# Patient Record
Sex: FEMALE
[De-identification: names, ages, dates, MRNs, and addresses within clinical notes are randomized per-mention and may not be internally consistent; named-entity substitution may affect disease eponyms.]

---

## 2017-08-24 ENCOUNTER — HOSPITAL ENCOUNTER (OUTPATIENT)
Dept: HOSPITAL 72 - SUR | Age: 42
Discharge: HOME | End: 2017-08-24
Payer: COMMERCIAL

## 2017-08-24 VITALS — SYSTOLIC BLOOD PRESSURE: 144 MMHG | DIASTOLIC BLOOD PRESSURE: 81 MMHG

## 2017-08-24 VITALS — SYSTOLIC BLOOD PRESSURE: 120 MMHG | DIASTOLIC BLOOD PRESSURE: 72 MMHG

## 2017-08-24 VITALS — DIASTOLIC BLOOD PRESSURE: 78 MMHG | SYSTOLIC BLOOD PRESSURE: 135 MMHG

## 2017-08-24 VITALS — DIASTOLIC BLOOD PRESSURE: 75 MMHG | SYSTOLIC BLOOD PRESSURE: 133 MMHG

## 2017-08-24 VITALS — SYSTOLIC BLOOD PRESSURE: 132 MMHG | DIASTOLIC BLOOD PRESSURE: 77 MMHG

## 2017-08-24 VITALS — DIASTOLIC BLOOD PRESSURE: 82 MMHG | SYSTOLIC BLOOD PRESSURE: 120 MMHG

## 2017-08-24 VITALS — SYSTOLIC BLOOD PRESSURE: 146 MMHG | DIASTOLIC BLOOD PRESSURE: 98 MMHG

## 2017-08-24 VITALS — DIASTOLIC BLOOD PRESSURE: 75 MMHG | SYSTOLIC BLOOD PRESSURE: 131 MMHG

## 2017-08-24 VITALS — SYSTOLIC BLOOD PRESSURE: 126 MMHG | DIASTOLIC BLOOD PRESSURE: 75 MMHG

## 2017-08-24 VITALS — BODY MASS INDEX: 25.16 KG/M2 | WEIGHT: 166 LBS | HEIGHT: 68 IN

## 2017-08-24 VITALS — SYSTOLIC BLOOD PRESSURE: 130 MMHG | DIASTOLIC BLOOD PRESSURE: 74 MMHG

## 2017-08-24 VITALS — DIASTOLIC BLOOD PRESSURE: 71 MMHG | SYSTOLIC BLOOD PRESSURE: 128 MMHG

## 2017-08-24 VITALS — DIASTOLIC BLOOD PRESSURE: 77 MMHG | SYSTOLIC BLOOD PRESSURE: 132 MMHG

## 2017-08-24 VITALS — SYSTOLIC BLOOD PRESSURE: 121 MMHG | DIASTOLIC BLOOD PRESSURE: 70 MMHG

## 2017-08-24 VITALS — SYSTOLIC BLOOD PRESSURE: 138 MMHG | DIASTOLIC BLOOD PRESSURE: 82 MMHG

## 2017-08-24 DIAGNOSIS — K64.8: ICD-10-CM

## 2017-08-24 DIAGNOSIS — K60.5: Primary | ICD-10-CM

## 2017-08-24 PROCEDURE — 94003 VENT MGMT INPAT SUBQ DAY: CPT

## 2017-08-24 PROCEDURE — 94150 VITAL CAPACITY TEST: CPT

## 2017-08-24 PROCEDURE — 46275 REMOVE ANAL FIST INTER: CPT

## 2017-08-24 PROCEDURE — 81025 URINE PREGNANCY TEST: CPT

## 2017-08-24 RX ADMIN — SODIUM CHLORIDE PRN MG: 9 INJECTION, SOLUTION INTRAVENOUS at 09:31

## 2017-08-24 RX ADMIN — SODIUM CHLORIDE PRN MG: 9 INJECTION, SOLUTION INTRAVENOUS at 10:07

## 2017-08-24 RX ADMIN — SODIUM CHLORIDE PRN MG: 9 INJECTION, SOLUTION INTRAVENOUS at 09:48

## 2017-08-24 RX ADMIN — SODIUM CHLORIDE PRN MG: 9 INJECTION, SOLUTION INTRAVENOUS at 10:23

## 2017-08-24 NOTE — DISCHARGE INSTRUCTIONS
Discharge Instructions


Discharge Instructions


Diet:  regular


Activity:  as tolerated


Follow Up Orders


- remove dressings and tape tonight. May wear pantyliner


- no soaking, baths. Shower only.


- call for appt with Dr. Cai in 2-3 weeks.





For Surgical Patients


May shower:  Yes





For Congestive Heart Failure


Reminder


Report to your physician any weight gain of 5 pounds or more in one week.











LIANNA CAI Aug 24, 2017 09:19

## 2017-08-24 NOTE — IMMEDIATE POST-OP EVALUATION
Immediate Post-Op Evalulation


Immediate Post-Op Evalulation


Procedure:  Anal fistula repair


Date of Evaluation:  Aug 24, 2017


Time of Evaluation:  09:00


IV Fluids:  1000


Blood Products:  0


Estimated Blood Loss:  0


Urinary Output:  0


Blood Pressure Systolic:  123


Blood Pressure Diastolic:  72


Pulse Rate:  62


Respiratory Rate:  14


O2 Sat by Pulse Oximetry:  99


Temperature (Fahrenheit):  98


Pain Score (1-10):  0


Nausea:  No


Vomiting:  No


Patient Status:  awake, reacts, patent


Given Within 1 Hr of Incision:  Yes


Time Given:  07:20











Farzad Carmona M.D. Aug 24, 2017 08:24

## 2017-08-24 NOTE — BRIEF OPERATIVE NOTE
Immediate Post Operative Note


Operative Note


Chief Complaint:  Recurrent fistula


Pre-op Diagnosis:


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


Procedure:


Exam under anesthesia, ligation of fistula tract (LIFT)


Post-op Diagnosis:


same


Post-op Diagnosis:  same as pre-op


Findings:  consistent w/pre-op dx studies


Surgeon:  Miranda Goncalves MD


Assistant:  Marisol Fish MD


Anesthesiologist:  Farzad Carmona MD


Anesthesia:  moderate sedation


Specimen:  none


Complications:  none


Fluids:  TKO


Estimated Blood Loss:  minimal


Drains:  none


Implant(s) used?:  MIRANDA Stoner Aug 24, 2017 09:18

## 2017-08-24 NOTE — PRE-PROCEDURE NOTE/ATTESTATION
Pre-Procedure Note/Attestation


Complete Prior to Procedure


Planned Procedure:  not applicable


Procedure Narrative:


Exam under anesthesia, possible anal fistulotomy, possible ligation of fistula 

tract, possible seton





Indications for Procedure


Pre-Operative Diagnosis:


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





Attestation


I attest that I discussed the nature of the procedure; its benefits; risks and 

complications; and alternatives (and the risks and benefits of such alternatives

), prior to the procedure, with the patient (or the patient's legal 

representative).





I attest that, if there was a reasonable possibility of needing a blood 

transfusion, the patient (or the patient's legal representative) was given the 

[place] Department of Health Services standardized written summary, pursuant 

to the [place] Blood Safety Act ([place] Health and Safety Code # 1645, as 

amended).





I attest that I re-evaluated the patient just prior to the surgery and that 

there has been no change in the patient's H&P, except as documented below:











LIANNA CAI Aug 24, 2017 06:47

## 2017-08-24 NOTE — ANETHESIA PREOPERATIVE EVAL
Anesthesia Pre-op PMH/ROS


General


Date of Evaluation:  Aug 24, 2017


Time of Evaluation:  07:00


Anesthesiologist:  NIKITA


ASA Score:  ASA 2


Mallampati Score


Class I : Soft palate, uvula, fauces, pillars visible


Class II: Soft palate, uvula, fauces visible


Class III: Soft palate, base of uvula visible


Class IV: Only hard plate visible


Mallampati Classification:  Class II


Surgeon:  Sacha


Diagnosis:  anal fistula


Surgical Procedure:  anal fistulectomy


Anesthesia History:  none


Family History:  no anesthesia problems


Allergies:  


Coded Allergies:  


     No Known Allergies (Unverified , 8/24/17)


Medications:  see eMAR





Anesthesia Pre-op Phys. Exam


Physician Exam





Last Vital Signs








  Date Time  Temp Pulse Resp B/P (MAP) Pulse Ox O2 Delivery O2 Flow Rate FiO2


 


8/24/17 06:31 98.0 70 19 120/72 100 Room Air  








Constitutional:  NAD


Neurologic:  CN 2-12 intact


Cardiovascular:  RRR


Respiratory:  CTA





Airway Exam


Mallampati Score:  Class II


MO:  full


ROM:  full


Teeth:  intact





Anesthesia Pre-op A/P


Labs


Urine Pregnancy Test











Test


  8/24/17


06:05


 


Urine HCG, Qualitative Negative  











Risk Assessment & Plan


Plan:


GA





Pre-Antibiotics


Given Within 1 Hr of Incision:  Yes


Time Given:  07:20











Farzad Carmona M.D. Aug 24, 2017 08:23

## 2017-08-25 NOTE — OPERATIVE NOTE - DICTATED
DATE OF OPERATION:  08/24/2017



PREOPERATIVE DIAGNOSIS:  Recurrent fistula-in-ano.



POSTOPERATIVE DIAGNOSIS:  Recurrent fistula-in-ano.



PROCEDURE:  Exam under anesthesia, ligation of fistula tract.



SURGEON:  Miranda Goncalves M.D.



ASSISTANT SURGEON:  Marisol Fish M.D.



ANESTHESIOLOGIST:  Farzad Carmona M.D.



ANESTHESIA:  Propofol sedation with local anesthetic.



INDICATION FOR PROCEDURE:  The patient is a 42-year-old 

female, who was sent to my office by her surgeon, Dr. Luis Weiss for

colorectal surgical evaluation of recurrent anal fistulas.  The patient

has had multiple fistula surgeries x10 since 2004 ranging to 2014.  The

patient was in [place], [place] at that time and had eight surgeries on

her anal area there and moved to [place] where last two surgeries were

by Dr. Weiss.  Endorectal advancement flap was her last surgery.  The

patient had an MRI performed, which showed that there was an anal fistula.

The patient had two external draining tracts with scar tissue from her

previous surgeries.  In light of the patient's findings, it was determined

at this time to proceed with exam under anesthesia with ligation of

fistula tract.



DESCRIPTION OF PROCEDURE:  Upon consent of the patient, the patient

was brought to the operating room and placed supine on the operating room

table.  Once adequate sedation was established with propofol drip, the

patient's buttocks were prepped and draped in usual surgical fashion.   A

40 mL of 0.5% ropivacaine with epinephrine mixed with 6 mg of

dexamethasone was used as a perianal pudendal block.  During the surgery,

an additional 10 mL of 0.5% ropivacaine with epinephrine was used.   The

patient was noted in her physical exam to have scarring in the left

lateral perianal region and two external draining tracts in the left

lateral perianal region as well as the posterior midline.   A

Hill-Alejandro retractor was placed in the anal canal _____ moderate

internal hemorrhoids and some scar tissue in the left lateral anal canal.

The posterior midline external anal opening was probed and it was noted to

be connected to the internal opening at the dentate line located at the

posterior midline.  The probe from the left lateral external opening

connected to the external opening of the posterior midline area.   At the

posterior midline, lacrimal probe was palpated and it was noted to be

quite deep.  It was determined at this time to proceed with the LIFT

procedure, which is the ligation of the fistula tract.  A small incision

was made overlying the lacrimal probe in the posterior midline in the

intersphincteric space and dissected all way down to the probe.  The probe

was noted to be quite deep and there was extensive scar tissue due to her

previous surgeries.  The tract was isolated and was noted to be very deep.

So, decision was made to close the internal opening from the inside and

also from the intersphincteric space as well as in the internal anal

canal.  These were done with 2-0 Vicryl figure-of-eight sutures in the

anal canal as well as 3-0 Vicryls in the intersphincteric place to close

the internal opening.  The distal end of the fistula tract was ligated

with a figure-of-eight 2-0 Vicryl sutures.  The tracts were again probed

to make sure there was no connection to the internal opening.  The tracts

were then curetted of any granulation tissue and the wound was irrigated.

Hemostasis was confirmed.  The wound was closed with the sphincteric

muscle to buttress in between the divided tracts in interrupted layers

using interrupted 3-0 Vicryl sutures.  The skin was closed with 3-0 Vicryl

interrupted horizontal mattress sutures.  Sterile dry dressing was used as

an outer dressing.  Sponge, needle, and instrument counts were correct at

the end of the case.   The patient was awakened from anesthesia and

brought to postanesthesia recovery in stable condition.



ESTIMATED BLOOD LOSS:  Less than 5 mL.



DRAINS:  None.



SPECIMEN:  None.



COMPLICATIONS:  None.









  ______________________________________________

  Miranda Goncalves M.D.





DR:  LC/PM

D:  08/24/2017 09:27

T:  08/25/2017 02:46

JOB#:  [id number]

CC:  Miranda Goncalves M.D.; Fax#:  [phone] Torres M.D.; Fax#:  [phone]

## 2018-01-10 ENCOUNTER — HOSPITAL ENCOUNTER (OUTPATIENT)
Dept: HOSPITAL 72 - SUR | Age: 43
Discharge: HOME | End: 2018-01-10
Payer: COMMERCIAL

## 2018-01-10 VITALS — DIASTOLIC BLOOD PRESSURE: 70 MMHG | SYSTOLIC BLOOD PRESSURE: 121 MMHG

## 2018-01-10 VITALS — DIASTOLIC BLOOD PRESSURE: 72 MMHG | SYSTOLIC BLOOD PRESSURE: 116 MMHG

## 2018-01-10 VITALS — SYSTOLIC BLOOD PRESSURE: 128 MMHG | DIASTOLIC BLOOD PRESSURE: 76 MMHG

## 2018-01-10 VITALS — DIASTOLIC BLOOD PRESSURE: 67 MMHG | SYSTOLIC BLOOD PRESSURE: 125 MMHG

## 2018-01-10 VITALS — SYSTOLIC BLOOD PRESSURE: 119 MMHG | DIASTOLIC BLOOD PRESSURE: 72 MMHG

## 2018-01-10 VITALS — SYSTOLIC BLOOD PRESSURE: 119 MMHG | DIASTOLIC BLOOD PRESSURE: 80 MMHG

## 2018-01-10 VITALS — WEIGHT: 170 LBS | HEIGHT: 68 IN | BODY MASS INDEX: 25.76 KG/M2

## 2018-01-10 VITALS — DIASTOLIC BLOOD PRESSURE: 78 MMHG | SYSTOLIC BLOOD PRESSURE: 126 MMHG

## 2018-01-10 VITALS — SYSTOLIC BLOOD PRESSURE: 114 MMHG | DIASTOLIC BLOOD PRESSURE: 70 MMHG

## 2018-01-10 VITALS — DIASTOLIC BLOOD PRESSURE: 64 MMHG | SYSTOLIC BLOOD PRESSURE: 115 MMHG

## 2018-01-10 VITALS — DIASTOLIC BLOOD PRESSURE: 71 MMHG | SYSTOLIC BLOOD PRESSURE: 124 MMHG

## 2018-01-10 VITALS — SYSTOLIC BLOOD PRESSURE: 118 MMHG | DIASTOLIC BLOOD PRESSURE: 68 MMHG

## 2018-01-10 VITALS — DIASTOLIC BLOOD PRESSURE: 74 MMHG | SYSTOLIC BLOOD PRESSURE: 118 MMHG

## 2018-01-10 VITALS — DIASTOLIC BLOOD PRESSURE: 86 MMHG | SYSTOLIC BLOOD PRESSURE: 112 MMHG

## 2018-01-10 VITALS — DIASTOLIC BLOOD PRESSURE: 76 MMHG | SYSTOLIC BLOOD PRESSURE: 133 MMHG

## 2018-01-10 VITALS — SYSTOLIC BLOOD PRESSURE: 123 MMHG | DIASTOLIC BLOOD PRESSURE: 70 MMHG

## 2018-01-10 DIAGNOSIS — K60.3: Primary | ICD-10-CM

## 2018-01-10 DIAGNOSIS — K50.90: ICD-10-CM

## 2018-01-10 DIAGNOSIS — Z88.8: ICD-10-CM

## 2018-01-10 PROCEDURE — 81025 URINE PREGNANCY TEST: CPT

## 2018-01-10 PROCEDURE — 94003 VENT MGMT INPAT SUBQ DAY: CPT

## 2018-01-10 PROCEDURE — 94150 VITAL CAPACITY TEST: CPT

## 2018-01-10 RX ADMIN — MORPHINE SULFATE PRN MG: 2 INJECTION, SOLUTION INTRAMUSCULAR; INTRAVENOUS at 09:37

## 2018-01-10 RX ADMIN — MORPHINE SULFATE PRN MG: 2 INJECTION, SOLUTION INTRAMUSCULAR; INTRAVENOUS at 09:21

## 2018-01-10 NOTE — ANETHESIA PREOPERATIVE EVAL
Anesthesia Pre-op PMH/ROS


General


Date of Evaluation:  Francisco J 10, 2018


Anesthesiologist:  [place]


ASA Score:  ASA 2


Mallampati Score


Class I : Soft palate, uvula, fauces, pillars visible


Class II: Soft palate, uvula, fauces visible


Class III: Soft palate, base of uvula visible


Class IV: Only hard plate visible


Mallampati Classification:  Class II


Surgeon:  Sacha


Diagnosis:  Anal fistula


Surgical Procedure:  EUA and possible seton placement


Anesthesia History:  none


Family History:  no anesthesia problems


Allergies:  


Coded Allergies:  


     METRONIDAZOLE (Verified  Allergy, Intermediate, stomach sentivity , 1/10/18

)


Medications:  see eMAR





Past Medical History


Cardiovascular:  Denies: HTN, CAD, MI, valve dz, arrhythmia, other


Pulmonary:  Denies: asthma, COPD, RADHA, other


Gastrointestinal/Genitourinary:  Reports: other - Chrons, 


   Denies: GERD, CRI, ESRD


Neurologic/Psychiatric:  Denies: dementia, CVA, depression/anxiety, TIA, other


Endocrine:  Reports: other - PCOS, 


   Denies: DM, hypothyroidism, steroids


HEENT:  Denies: cataract (L), cataract (R), glaucoma, Nikolski (L), Nikolski (R), other


Hematology/Immune:  Denies: anemia, DVT, bleeding disorder, other


Musculoskeletal/Integumentary:  Denies: OA, RA, DJD, DDD, edema, other


PSxH Narrative:


Anal fistula repair, otoplasty





Anesthesia Pre-op Phys. Exam


Physician Exam





Last Vital Signs








  Date Time  Temp Pulse Resp B/P (MAP) Pulse Ox O2 Delivery O2 Flow Rate FiO2


 


1/10/18 06:39 98.9 74 18 133/76 98 Room Air  








Constitutional:  NAD


Cardiovascular:  RRR


Respiratory:  CTA





Airway Exam


Mallampati Score:  Class II


MO:  full


ROM:  full


Teeth:  intact





Anesthesia Pre-op A/P


Labs


see chart


Urine Pregnancy Test











Test


  1/10/18


06:10


 


Urine HCG, Qualitative Pending  











Studies


Pre-op Studies:  EKG - sr





Risk Assessment & Plan


Assessment:


ASA II


Plan:


GA


Status Change Before Surgery:  No





Pre-Antibiotics


Drug:  Cefoxitin 1g


Given Within 1 Hr of Incision:  Yes


Time Given:  07:15











ARNOLD ROGERS M.D. Francisco J 10, 2018 06:42

## 2018-01-10 NOTE — 48 HOUR POST ANESTHESIA EVAL
Post Anesthesia Evaluation


Procedure:  Rectal EUA and seton placement


Date of Evaluation:  Francisco J 10, 2018


Blood Pressure Systolic:  118


0:  74


Pulse Rate:  85


Respiratory Rate:  19


Temperature (Fahrenheit):  98


O2 Sat by Pulse Oximetry:  96


Airway:  patent


Nausea:  No


Vomiting:  No


Pain Intensity:  0


Hydration Status:  adequate


Cardiopulmonary Status:


at baseline


Mental Status/LOC:  patient returned to baseline


Post-Anesthesia Complications:


0


Follow-up care needed:  ready to discharge











ARNOLD ROGERS M.D. Francisco J 10, 2018 07:49

## 2018-01-10 NOTE — IMMEDIATE POST-OP EVALUATION
Immediate Post-Op Evalulation


Immediate Post-Op Evalulation


Procedure:  Rectal EUA and seton placement


Date of Evaluation:  Francisco J 10, 2018


Time of Evaluation:  08:27


IV Fluids:  800


Blood Products:  0


Estimated Blood Loss:  min


Urinary Output:  0


Blood Pressure Systolic:  129


Blood Pressure Diastolic:  85


Pulse Rate:  95


Respiratory Rate:  16


O2 Sat by Pulse Oximetry:  99


Temperature (Fahrenheit):  98


Pain Score (1-10):  0


Nausea:  No


Vomiting:  No


Complications


0


Patient Status:  awake, reacts, patent, none


Hydration Status:  adequate


Drug:  Cefoxitin 1g


Given Within 1 Hr of Incision:  Yes


Time Given:  07:15











ARNOLD ROGERS M.D. Francisco J 10, 2018 07:28

## 2018-01-10 NOTE — OPERATIVE NOTE - DICTATED
DATE OF OPERATION:  01/10/2018



PREOPERATIVE DIAGNOSIS:  Recurrent fistula-in-ano.



POSTOPERATIVE DIAGNOSIS:  Recurrent fistula-in-ano.



PROCEDURES:  Exam under anesthesia, anal fistulotomy, seton placement.



SURGEON:  Miranda Goncalves M.D.



ANESTHESIOLOGIST:  Tamie Capps M.D.



ANESTHESIA:  General endotracheal anesthesia.



INDICATION FOR PROCEDURE:  The patient is a 42-year-old  female

with a long history of recurrent anal fistulas.  The patient had 10

fistula repairs from 2004 to 2014.  The patient had endorectal

advancement, status post multiple anal fistulotomies.  The patient was

taken to surgery on 08/24/2017, by me for ligation of anal fistula tract.

This resulted in healing of the posterior wound, but persistent drainage

from the original incision.  In light of the patient's history, it was

determined at this time to proceed with a repeat exam under anesthesia

with anal fistulotomy at this time with seton placement.



DESCRIPTION OF PROCEDURE:  Upon consent of the patient, the patient was

brought to the operating room and intubated in the supine position on the

gurney.  Once general endotracheal anesthesia had been established, the

patient was repositioned in the prone jackknife position on the operating

room table.  The patient's buttocks were then prepped and draped in the

usual surgical fashion.  A 40 mL of 0.5% ropivacaine with epinephrine

mixed with 6 mg of dexamethasone was used as a perineal and pudendal

block.  A Hill-Alejandro retractor was placed in the anal canal.  There was

noted to be the original posterior fistula opening that was healed and

there was a persistent opening at the posterior midline, closure to the

anal verge from the original LIFT incision.  This was probed with a

lacrimal probe and there was a short fistula tract with an internal

opening at the dentate line in the midline.  The skin was opened overlying

the tract.   There was noted to be a significant amount of 40% of the

sphincteric muscle involved.  So, a seton was placed over this and over

surrounding the sphincteric area and secured with 0 silk sutures.  The

left perianal opening was curetted and probed and noted to be

communicating with the posterior midline internal opening.  There was no

other internal opening that was noted.  The tracts were then curetted and

irrigation was performed.   Hemostasis was confirmed.  Proctosigmoidoscopy

up to 25 cm was performed, which showed no other proctitis or abnormality.

There were multiple scarring of the anal area and the perianal region

secondary to her multiple surgeries.  A wet-to-dry dressing was used to

pack the wounds in the posterior midline, also in the left lateral

perianal area and sterile dry dressing was used overlying the wound.

Sponge, needle, and instrument counts were correct at the end of the case.

The patient was awakened from anesthesia, extubated, and brought to

postanesthesia recovery in stable condition.



ESTIMATED BLOOD LOSS:  10 mL.



DRAINS:  Seton in the posterior midline perianal region.



SPECIMEN:  None.



COMPLICATIONS:  None.









  ______________________________________________

  Miranda Goncalves M.D.





DR:  CEDRICK

D:  01/10/2018 08:15

T:  01/10/2018 11:38

JOB#:  [id number]

CC:  Miranda Goncalves M.D.; Fax#:  [phone]

TISHA DE LA ROSA M.D.  ; FAX#:  [phone]

Ariana Null M.D.



[facility]

## 2018-01-10 NOTE — PRE-PROCEDURE NOTE/ATTESTATION
Pre-Procedure Note/Attestation


Complete Prior to Procedure


Planned Procedure:  not applicable


Procedure Narrative:


Exam under anesthesia, anal fistulotomy, possible seton placement





Indications for Procedure


Pre-Operative Diagnosis:


recurrent anal fistulas





Attestation


I attest that I discussed the nature of the procedure; its benefits; risks and 

complications; and alternatives (and the risks and benefits of such alternatives

), prior to the procedure, with the patient (or the patient's legal 

representative).





I attest that, if there was a reasonable possibility of needing a blood 

transfusion, the patient (or the patient's legal representative) was given the 

[facility] standardized written summary, pursuant 

to the [place] Blood Safety Act ([place] Health and Safety Code # 1645, as 

amended).





I attest that I re-evaluated the patient just prior to the surgery and that 

there has been no change in the patient's H&P, except as documented below:











LIANNA ACI Francisco J 10, 2018 06:49